# Patient Record
(demographics unavailable — no encounter records)

---

## 2025-02-13 NOTE — HISTORY OF PRESENT ILLNESS
[FreeTextEntry1] : Mr. KARISHMA WEIR is a 64 year old male with history of Narvaez's esophagus.  Patient had last upper endoscopy in 2022 which showed Narvaez's esophagus without dysplasia.  Patient has no complaints of heartburn or dysphagia.  There has been no other significant change in medical history other than starting metformin.  Patient had colonoscopy at that time and is due in 2027.